# Patient Record
(demographics unavailable — no encounter records)

---

## 2025-02-27 NOTE — PHYSICAL EXAM
[1+] : 1+ [Increased Work of Breathing] : no increased work of breathing with use of accessory muscles and retractions [Normal muscle strength, symmetry and tone of facial, head and neck musculature] : normal muscle strength, symmetry and tone of facial, head and neck musculature [Normal] : no cervical lymphadenopathy [de-identified] : cryptic

## 2025-02-27 NOTE — HISTORY OF PRESENT ILLNESS
[No Personal or Family History of Easy Bruising, Bleeding, or Issues with General Anesthesia] : No Personal or Family History of easy bruising, bleeding, or issues with general anesthesia [de-identified] : 16 year old with tonsil stones  Noticed a few months ago  Occurs 1-2 times a week  She will pick them out and use water pick  No daily gargles   No strep infections   Snores at night even in character  Daytime tiredness.  ADHD   Open mouth breather  Has used Flonase with illness   No hearing concerns  Passed the  hearing test   No hx of asthma, RAD or needing nebs  Neurologist - ADHD

## 2025-02-27 NOTE — CONSULT LETTER
[Dear  ___] : Dear  [unfilled], [Courtesy Letter:] : I had the pleasure of seeing your patient, [unfilled], in my office today. [Please see my note below.] : Please see my note below. [Consult Closing:] : Thank you very much for allowing me to participate in the care of this patient.  If you have any questions, please do not hesitate to contact me. [Sincerely,] : Sincerely, [FreeTextEntry2] : Sudhir Adler  [address] [phone]    [FreeTextEntry3] : Davide Grimes MD Chief, Pediatric Otolaryngology [facility] and Jeanie Blas [facility] Professor of Otolaryngology [employer] [facility] [employer] at [facility]

## 2025-02-27 NOTE — REASON FOR VISIT
[Initial Evaluation] : an initial evaluation for [Mother] : mother [FreeTextEntry2] : tonsil stones